# Patient Record
Sex: FEMALE | Race: WHITE | NOT HISPANIC OR LATINO | URBAN - METROPOLITAN AREA
[De-identification: names, ages, dates, MRNs, and addresses within clinical notes are randomized per-mention and may not be internally consistent; named-entity substitution may affect disease eponyms.]

---

## 2022-05-23 NOTE — H&P PST ADULT - HISTORY OF PRESENT ILLNESS
Department of Cardiology                                                                  [facility]/[place] [address] [address]                                                            Telephone: [phone]. Fax:[phone]                                                                             Pre- Procedure H + P/Progress Note      HPI:  57 yo female with h/o being mental challenged, Kavon syndrome s/p supravalvular AS, TR, MR, GERD, IBS, CAD s/p stent, HLD who had a CT scan of the chest which revealed coronary calcification. She also had leonidas stress test but results unavailable.      Symptoms:        Angina (Class):        Ischemic Symptoms:     Heart Failure:        Systolic/Diastolic/Combined:        NYHA Class (within 2 weeks):     Assessment of LVEF:       EF:        Assessed by:        Date:     Prior Cardiac Interventions:         Noninvasive Testing:   Stress Test: Date:        Protocol:        Duration of Exercise:        Symptoms:        EKG Changes:        DTS:        Myocardial Imaging:        Risk Assessment (Low, Medium, High):     Echo:       Risk Assessments:  ASA:  Mallampati:  Bleeding Risk:  Creatinine:  GFR:    Associated Risk Factors:        Cerebrovascular Disease: N/A       Chronic Lung Disease: N/A       Peripheral Arterial Disease: N/A       Chronic Kidney Disease (if yes, what is GFR): N/A       Uncontrolled Diabetes (if yes, what is HgbA1C or FBS): N/A       Poorly Controlled Hypertension (if yes, what is SBP): N/A       Morbid Obesity (if yes, what is BMI): N/A       History of Recent Ventricular Arrhythmia: N/A       Inability to Ambulate Safely: N/A       Need for Therapeutic Anticoagulation: N/A       Antiplatelet or Contrast Allergy: N/A       Fraility: Mild/Moderate/Severe    Antianginal Therapies:        Beta Blockers:         Calcium Channel Blockers:        Long Acting Nitrates:        Ranexa:     	  MEDICATIONS:                      T(C): --  HR: --  BP: --  RR: --  SpO2: --  Wt(kg): --      I&O's Summary      Daily     Daily     TELEMETRY: 	      ECG:  	    LABS:	 	                        Tnl:    Lipid Profile:   TC  TG  LDL  HDL    HgA1c:     proBNP:     TSH:                                                                         Pre- Procedure H + P/Progress Note      HPI:  57 yo female with h/o being mental challenged, Kavon syndrome s/p supravalvular AS, TR, MR, GERD, IBS, CAD s/p stent, HLD who had a CT scan of the chest which revealed coronary calcification. She also had leonidas stress test but results unavailable.      Symptoms:        Angina (Class):        Ischemic Symptoms:     Heart Failure:        Systolic/Diastolic/Combined:        NYHA Class (within 2 weeks):     Assessment of LVEF:       EF:         Assessed by:        Date:     Prior Cardiac Interventions:         Noninvasive Testing:   Stress Test: Date:        Protocol:        Duration of Exercise:        Symptoms:        EKG Changes:        DTS:        Myocardial Imaging:        Risk Assessment (Low, Medium, High):     Echo:       Risk Assessments:  ASA:  Mallampati:  Bleeding Risk:  Creatinine:  GFR:    Associated Risk Factors:        Cerebrovascular Disease: N/A       Chronic Lung Disease: N/A       Peripheral Arterial Disease: N/A       Chronic Kidney Disease (if yes, what is GFR): N/A       Uncontrolled Diabetes (if yes, what is HgbA1C or FBS): N/A       Poorly Controlled Hypertension (if yes, what is SBP): N/A       Morbid Obesity (if yes, what is BMI): N/A       History of Recent Ventricular Arrhythmia: N/A       Inability to Ambulate Safely: N/A       Need for Therapeutic Anticoagulation: N/A       Antiplatelet or Contrast Allergy: N/A       Fraility: Mild/Moderate/Severe    Antianginal Therapies:        Beta Blockers:         Calcium Channel Blockers:        Long Acting Nitrates:        Ranexa:     	  MEDICATIONS:                      T(C): --  HR: --  BP: --  RR: --  SpO2: --  Wt(kg): --      I&O's Summary      Daily     Daily     TELEMETRY: 	      ECG:  	    LABS:	 	                        Tnl:    Lipid Profile:   TC  TG  LDL  HDL    HgA1c:     proBNP:     TSH:                                                                         Pre- Procedure H + P/Progress Note      HPI:  57 yo female with h/o being mental challenged, Kavon syndrome s/p supravalvular AS, TR, MR, GERD, IBS, CAD s/p stent, HLD who had a CT scan of the chest which revealed coronary calcification. She also had leonidas stress test but results unavailable.      Symptoms:        Angina (Class):   II        Ischemic Symptoms:     Heart Failure:        Systolic/Diastolic/Combined:   No        NYHA Class (within 2 weeks):   No     Assessment of LVEF:  unavailable        EF:           Assessed by:        Date:     Prior Cardiac Interventions:   None          Noninvasive Testing:     Stress Test: Date:        Protocol:        Duration of Exercise:        Symptoms:        EKG Changes:        DTS:        Myocardial Imaging: abnormal stress        Risk Assessment (Low, Medium, High):     Echo:       Risk Assessments:  ASA:    2  Mallampati:    2  Bleeding Risk:  Creatinine:  GFR:    Associated Risk Factors:        Cerebrovascular Disease: N/A       Chronic Lung Disease: N/A       Peripheral Arterial Disease: N/A       Chronic Kidney Disease (if yes, what is GFR): N/A       Uncontrolled Diabetes (if yes, what is HgbA1C or FBS): N/A       Poorly Controlled Hypertension (if yes, what is SBP): N/A       Morbid Obesity (if yes, what is BMI): N/A       History of Recent Ventricular Arrhythmia: N/A       Inability to Ambulate Safely: N/A       Need for Therapeutic Anticoagulation: N/A       Antiplatelet or Contrast Allergy: N/A       Fraility: Mild/Moderate/Severe    Antianginal Therapies:        Beta Blockers:    Metoprolol        Calcium Channel Blockers:        Long Acting Nitrates:        Ranexa:     	  MEDICATIONS:    T(C): --  HR: --  BP: --  RR: --  SpO2: --  Wt(kg): --      I&O's Summary    Daily     TELEMETRY:   NSR 	      ECG:  	      LABS:	 	    Tnl:    Lipid Profile:   TC  TG  LDL  HDL    HgA1c:     proBNP:     TSH:                                                                         Pre- Procedure H + P/Progress Note      HPI:  57 yo female with h/o being mental challenged, Kavon syndrome s/p supravalvular AS, TR, MR, GERD, IBS, CAD s/p stent, HLD who had a CT scan of the chest which revealed coronary calcification. She also had leondias stress test but results unavailable.      Symptoms:        Angina (Class):   II        Ischemic Symptoms:  intermittent chest pain     Heart Failure:        Systolic/Diastolic/Combined:   No        NYHA Class (within 2 weeks):   No     Assessment of LVEF:  unavailable        EF:           Assessed by:        Date:     Prior Cardiac Interventions:   None          Noninvasive Testing:     Stress Test: Date:        Protocol:        Duration of Exercise:        Symptoms:        EKG Changes:        DTS:        Myocardial Imaging: abnormal stress        Risk Assessment (Low, Medium, High):     Echo:       Risk Assessments:  ASA:    2  Mallampati:    2  Bleeding Risk:  1.0%  Creatinine:   0.69  GFR:  101    Associated Risk Factors:        Cerebrovascular Disease: N/A       Chronic Lung Disease: N/A       Peripheral Arterial Disease: N/A       Chronic Kidney Disease (if yes, what is GFR): N/A       Uncontrolled Diabetes (if yes, what is HgbA1C or FBS): N/A       Poorly Controlled Hypertension (if yes, what is SBP): N/A       Morbid Obesity (if yes, what is BMI): N/A       History of Recent Ventricular Arrhythmia: N/A       Inability to Ambulate Safely: N/A       Need for Therapeutic Anticoagulation: N/A       Antiplatelet or Contrast Allergy: N/A       Fraility: Mild/Moderate/Severe    Antianginal Therapies:        Beta Blockers:    Metoprolol        Calcium Channel Blockers:        Long Acting Nitrates:        Ranexa:     	  MEDICATIONS:    T(C): --   98.4  HR: --    77   BP: --   143/73  RR: --   18  SpO2: --    98  Wt(kg): --      I&O's Summary    Daily     TELEMETRY:   NSR 	      ECG:  	NSR       LABS:	 	    Tnl:    Lipid Profile:   TC    169  TG    68  LDL   90  HDL   65    HgA1c:        proBNP:     TSH:                                                                         Pre- Procedure H + P/Progress Note      HPI:  57 yo female with h/o being mental challenged, Kavon syndrome s/p supravalvular AS, TR, MR, GERD, IBS, CAD s/p stent, HLD who had a CT scan of the chest which revealed coronary calcification. She also had leonidas stress test which was reported abnormal. Given patient's abnormal findings, she is now scheduled for left heart catheterization to evaluate progression of CAD.       Symptoms:        Angina (Class):   II        Ischemic Symptoms:  intermittent chest pain     Heart Failure:        Systolic/Diastolic/Combined:   No        NYHA Class (within 2 weeks):   No     Assessment of LVEF:  unavailable        EF:           Assessed by:        Date:     Prior Cardiac Interventions:   None          Noninvasive Testing:     Stress Test: Date:        Protocol:        Duration of Exercise:        Symptoms:        EKG Changes:        DTS:        Myocardial Imaging: abnormal stress        Risk Assessment (Low, Medium, High):     Echo:       Risk Assessments:  ASA:    2  Mallampati:    2  Bleeding Risk:  1.0%  Creatinine:   0.69  GFR:  101    Associated Risk Factors:        Cerebrovascular Disease: N/A       Chronic Lung Disease: N/A       Peripheral Arterial Disease: N/A       Chronic Kidney Disease (if yes, what is GFR): N/A       Uncontrolled Diabetes (if yes, what is HgbA1C or FBS): N/A       Poorly Controlled Hypertension (if yes, what is SBP): N/A       Morbid Obesity (if yes, what is BMI): N/A       History of Recent Ventricular Arrhythmia: N/A       Inability to Ambulate Safely: N/A       Need for Therapeutic Anticoagulation: N/A       Antiplatelet or Contrast Allergy: N/A       Fraility: Mild/Moderate/Severe    Antianginal Therapies:        Beta Blockers:    Metoprolol        Calcium Channel Blockers:        Long Acting Nitrates:        Ranexa:     	  MEDICATIONS:    T(C): --   98.4  HR: --    77   BP: --   143/73  RR: --   18  SpO2: --    98  Wt(kg): --      I&O's Summary    Daily     TELEMETRY:   NSR 	      ECG:  	NSR       LABS:	 	    Tnl:    Lipid Profile:   TC    169  TG    68  LDL   90  HDL   65    HgA1c:        proBNP:     TSH:

## 2022-05-23 NOTE — H&P PST ADULT - ASSESSMENT
Impression:    55 yo female with calcification on CT and abnormal stress test who presents for LHC.    Plan:  -plan for LHC via RA vs FA  -patient seen and examined  -confirmed appropriate NPO duration  -ECG and Labs reviewed  -Aspirin 81mg po pre-cath  -procedure discussed with patient; risks and benefits explained, questions answered  -consent obtained by attending IC       Impression:    57 yo female with calcification on CT and stress test with full report missing who presents for LHC.    Plan:  -plan for LHC via RA vs FA  -patient seen and examined  -confirmed appropriate NPO duration  -ECG and Labs reviewed  -Aspirin 81mg po pre-cath  -procedure discussed with patient; risks and benefits explained, questions answered  -consent obtained by attending IC       Impression:    55 yo female with calcification on CT and stress test with full report missing who presents for LHC.    Plan:  -plan for LHC via RA vs FA  -patient seen and examined  -confirmed appropriate NPO duration  -ECG and Labs reviewed  -Aspirin 81mg po pre-cath  -IVH  ml x1 bolus pre and post catheterization   -procedure discussed with patient; risks and benefits explained, questions answered  -consent obtained by attending IC       Impression:    57 yo female with calcification on CT and stress which were reported abnormal.     Plan:  -plan for LHC via RA vs FA  -patient seen and examined  -confirmed appropriate NPO duration  -ECG and Labs reviewed  -no IVH 2/2 pt with reported history of AS/MR, unknown severity ( no echo available)   -Aspirin 81mg po pre-cath  -procedure discussed with patient; risks and benefits explained, questions answered  -consent obtained by attending IC

## 2022-05-24 ENCOUNTER — OUTPATIENT (OUTPATIENT)
Dept: OUTPATIENT SERVICES | Facility: HOSPITAL | Age: 57
LOS: 1 days | Discharge: HOME | End: 2022-05-24
Payer: MEDICARE

## 2022-05-24 VITALS
HEART RATE: 77 BPM | TEMPERATURE: 98 F | OXYGEN SATURATION: 98 % | HEIGHT: 60 IN | RESPIRATION RATE: 18 BRPM | WEIGHT: 138.89 LBS | SYSTOLIC BLOOD PRESSURE: 143 MMHG | DIASTOLIC BLOOD PRESSURE: 75 MMHG

## 2022-05-24 VITALS
RESPIRATION RATE: 18 BRPM | DIASTOLIC BLOOD PRESSURE: 57 MMHG | OXYGEN SATURATION: 98 % | HEART RATE: 62 BPM | SYSTOLIC BLOOD PRESSURE: 110 MMHG

## 2022-05-24 DIAGNOSIS — R07.9 CHEST PAIN, UNSPECIFIED: ICD-10-CM

## 2022-05-24 LAB
ALBUMIN SERPL ELPH-MCNC: 4.1 G/DL — SIGNIFICANT CHANGE UP (ref 3.3–5.2)
ALP SERPL-CCNC: 116 U/L — SIGNIFICANT CHANGE UP (ref 40–120)
ALT FLD-CCNC: 12 U/L — SIGNIFICANT CHANGE UP
ANION GAP SERPL CALC-SCNC: 13 MMOL/L — SIGNIFICANT CHANGE UP (ref 5–17)
AST SERPL-CCNC: 22 U/L — SIGNIFICANT CHANGE UP
BASOPHILS # BLD AUTO: 0.07 K/UL — SIGNIFICANT CHANGE UP (ref 0–0.2)
BASOPHILS NFR BLD AUTO: 1.1 % — SIGNIFICANT CHANGE UP (ref 0–2)
BILIRUB SERPL-MCNC: 0.4 MG/DL — SIGNIFICANT CHANGE UP (ref 0.4–2)
BUN SERPL-MCNC: 14.8 MG/DL — SIGNIFICANT CHANGE UP (ref 8–20)
CALCIUM SERPL-MCNC: 9.5 MG/DL — SIGNIFICANT CHANGE UP (ref 8.6–10.2)
CHLORIDE SERPL-SCNC: 103 MMOL/L — SIGNIFICANT CHANGE UP (ref 98–107)
CHOLEST SERPL-MCNC: 169 MG/DL — SIGNIFICANT CHANGE UP
CO2 SERPL-SCNC: 24 MMOL/L — SIGNIFICANT CHANGE UP (ref 22–29)
CREAT SERPL-MCNC: 0.69 MG/DL — SIGNIFICANT CHANGE UP (ref 0.5–1.3)
EGFR: 101 ML/MIN/1.73M2 — SIGNIFICANT CHANGE UP
EOSINOPHIL # BLD AUTO: 0.05 K/UL — SIGNIFICANT CHANGE UP (ref 0–0.5)
EOSINOPHIL NFR BLD AUTO: 0.8 % — SIGNIFICANT CHANGE UP (ref 0–6)
GLUCOSE SERPL-MCNC: 105 MG/DL — HIGH (ref 70–99)
HCT VFR BLD CALC: 39.4 % — SIGNIFICANT CHANGE UP (ref 34.5–45)
HDLC SERPL-MCNC: 65 MG/DL — SIGNIFICANT CHANGE UP
HGB BLD-MCNC: 13.2 G/DL — SIGNIFICANT CHANGE UP (ref 11.5–15.5)
IMM GRANULOCYTES NFR BLD AUTO: 0.5 % — SIGNIFICANT CHANGE UP (ref 0–1.5)
LIPID PNL WITH DIRECT LDL SERPL: 90 MG/DL — SIGNIFICANT CHANGE UP
LYMPHOCYTES # BLD AUTO: 2.04 K/UL — SIGNIFICANT CHANGE UP (ref 1–3.3)
LYMPHOCYTES # BLD AUTO: 32.4 % — SIGNIFICANT CHANGE UP (ref 13–44)
MAGNESIUM SERPL-MCNC: 1.8 MG/DL — SIGNIFICANT CHANGE UP (ref 1.6–2.6)
MCHC RBC-ENTMCNC: 33.5 GM/DL — SIGNIFICANT CHANGE UP (ref 32–36)
MCHC RBC-ENTMCNC: 34.3 PG — HIGH (ref 27–34)
MCV RBC AUTO: 102.3 FL — HIGH (ref 80–100)
MONOCYTES # BLD AUTO: 0.76 K/UL — SIGNIFICANT CHANGE UP (ref 0–0.9)
MONOCYTES NFR BLD AUTO: 12.1 % — SIGNIFICANT CHANGE UP (ref 2–14)
NEUTROPHILS # BLD AUTO: 3.35 K/UL — SIGNIFICANT CHANGE UP (ref 1.8–7.4)
NEUTROPHILS NFR BLD AUTO: 53.1 % — SIGNIFICANT CHANGE UP (ref 43–77)
NON HDL CHOLESTEROL: 104 MG/DL — SIGNIFICANT CHANGE UP
PLATELET # BLD AUTO: 200 K/UL — SIGNIFICANT CHANGE UP (ref 150–400)
POTASSIUM SERPL-MCNC: 4.4 MMOL/L — SIGNIFICANT CHANGE UP (ref 3.5–5.3)
POTASSIUM SERPL-SCNC: 4.4 MMOL/L — SIGNIFICANT CHANGE UP (ref 3.5–5.3)
PROT SERPL-MCNC: 7 G/DL — SIGNIFICANT CHANGE UP (ref 6.6–8.7)
RBC # BLD: 3.85 M/UL — SIGNIFICANT CHANGE UP (ref 3.8–5.2)
RBC # FLD: 12.3 % — SIGNIFICANT CHANGE UP (ref 10.3–14.5)
SODIUM SERPL-SCNC: 140 MMOL/L — SIGNIFICANT CHANGE UP (ref 135–145)
TRIGL SERPL-MCNC: 68 MG/DL — SIGNIFICANT CHANGE UP
WBC # BLD: 6.3 K/UL — SIGNIFICANT CHANGE UP (ref 3.8–10.5)
WBC # FLD AUTO: 6.3 K/UL — SIGNIFICANT CHANGE UP (ref 3.8–10.5)

## 2022-05-24 PROCEDURE — 93458 L HRT ARTERY/VENTRICLE ANGIO: CPT

## 2022-05-24 PROCEDURE — 93010 ELECTROCARDIOGRAM REPORT: CPT

## 2022-05-24 PROCEDURE — 93005 ELECTROCARDIOGRAM TRACING: CPT

## 2022-05-24 PROCEDURE — 85025 COMPLETE CBC W/AUTO DIFF WBC: CPT

## 2022-05-24 PROCEDURE — 99152 MOD SED SAME PHYS/QHP 5/>YRS: CPT

## 2022-05-24 PROCEDURE — 80053 COMPREHEN METABOLIC PANEL: CPT

## 2022-05-24 PROCEDURE — 36415 COLL VENOUS BLD VENIPUNCTURE: CPT

## 2022-05-24 PROCEDURE — C1760: CPT

## 2022-05-24 PROCEDURE — C1894: CPT

## 2022-05-24 PROCEDURE — 80061 LIPID PANEL: CPT

## 2022-05-24 PROCEDURE — C1887: CPT

## 2022-05-24 PROCEDURE — 83735 ASSAY OF MAGNESIUM: CPT

## 2022-05-24 RX ORDER — SODIUM CHLORIDE 9 MG/ML
250 INJECTION INTRAMUSCULAR; INTRAVENOUS; SUBCUTANEOUS ONCE
Refills: 0 | Status: COMPLETED | OUTPATIENT
Start: 2022-05-24 | End: 2022-05-24

## 2022-05-24 RX ORDER — QUETIAPINE FUMARATE 200 MG/1
0.5 TABLET, FILM COATED ORAL
Qty: 0 | Refills: 0 | DISCHARGE

## 2022-05-24 RX ORDER — FLUTICASONE PROPIONATE 220 MCG
2 AEROSOL WITH ADAPTER (GRAM) INHALATION
Qty: 0 | Refills: 0 | DISCHARGE

## 2022-05-24 RX ORDER — POTASSIUM CHLORIDE 20 MEQ
1 PACKET (EA) ORAL
Qty: 0 | Refills: 0 | DISCHARGE

## 2022-05-24 RX ORDER — METOPROLOL TARTRATE 50 MG
1 TABLET ORAL
Qty: 0 | Refills: 0 | DISCHARGE

## 2022-05-24 RX ORDER — LEVOCETIRIZINE DIHYDROCHLORIDE 0.5 MG/ML
1 SOLUTION ORAL
Qty: 0 | Refills: 0 | DISCHARGE

## 2022-05-24 RX ORDER — TIMOLOL 0.5 %
1 DROPS OPHTHALMIC (EYE)
Qty: 0 | Refills: 0 | DISCHARGE

## 2022-05-24 RX ORDER — ACETAMINOPHEN 500 MG
1000 TABLET ORAL ONCE
Refills: 0 | Status: COMPLETED | OUTPATIENT
Start: 2022-05-24 | End: 2022-05-24

## 2022-05-24 RX ORDER — LATANOPROST 0.05 MG/ML
1 SOLUTION/ DROPS OPHTHALMIC; TOPICAL
Qty: 0 | Refills: 0 | DISCHARGE

## 2022-05-24 RX ORDER — QUETIAPINE FUMARATE 200 MG/1
1 TABLET, FILM COATED ORAL
Qty: 0 | Refills: 0 | DISCHARGE

## 2022-05-24 RX ORDER — ESOMEPRAZOLE MAGNESIUM 40 MG/1
1 CAPSULE, DELAYED RELEASE ORAL
Qty: 0 | Refills: 0 | DISCHARGE

## 2022-05-24 RX ORDER — POLYETHYLENE GLYCOL 3350 17 G/17G
1 POWDER, FOR SOLUTION ORAL
Qty: 0 | Refills: 0 | DISCHARGE

## 2022-05-24 RX ORDER — DOCUSATE SODIUM 100 MG
1 CAPSULE ORAL
Qty: 0 | Refills: 0 | DISCHARGE

## 2022-05-24 RX ORDER — ASPIRIN/CALCIUM CARB/MAGNESIUM 324 MG
1 TABLET ORAL
Qty: 0 | Refills: 0 | DISCHARGE

## 2022-05-24 RX ORDER — ATORVASTATIN CALCIUM 80 MG/1
1 TABLET, FILM COATED ORAL
Qty: 0 | Refills: 0 | DISCHARGE

## 2022-05-24 RX ORDER — BRIMONIDINE TARTRATE 2 MG/MG
1 SOLUTION/ DROPS OPHTHALMIC
Qty: 0 | Refills: 0 | DISCHARGE

## 2022-05-24 RX ADMIN — Medication 400 MILLIGRAM(S): at 14:45

## 2022-05-24 RX ADMIN — Medication 1000 MILLIGRAM(S): at 15:14

## 2022-05-24 NOTE — DISCHARGE NOTE PROVIDER - HOSPITAL COURSE
55 yo female with h/o being mental challenged, Kavon syndrome s/p supravalvular AS, TR, MR, GERD, IBS, CAD s/p stent, HLD who had a CT scan of the chest which revealed coronary calcification. She also had leonidas stress test but results unavailable.     55 yo female with h/o being mental challenged, Kavon syndrome s/p supravalvular AS, TR, MR, GERD, IBS, CAD s/p stent, HLD who had a CT scan of the chest which revealed coronary calcification. She also had leonidas stress test which was reported abnormal. Given patient's abnormal findings, she is now scheduled for left heart catheterization to evaluate progression of CAD.  Patient now status post left heart catheterization via right femoral aretry which showed clean coronaries. Hemostasis achieved with 6 [place] Angioseal. Continue current medical management. Follow with Dr. Almanza on discharge.

## 2022-05-24 NOTE — DISCHARGE NOTE PROVIDER - NSDCMRMEDTOKEN_GEN_ALL_CORE_FT
Alphagan P 0.1% ophthalmic solution: 1 dose(s) to each affected eye 2 times a day  aspirin 81 mg oral tablet, chewable: 1 tab(s) orally once a day  Ativan 0.5 mg oral tablet:   atorvastatin 10 mg oral tablet: 1 tab(s) orally once a day  Colace 50 mg oral capsule: 1 cap(s) orally 2 times a day  fluticasone propionate 55 mcg/inh inhalation powder: 2 inhaler(s) inhaled once a day  Klor-Con 20 mEq oral powder for reconstitution: 1 each orally once a day  latanoprost 0.005% ophthalmic solution: 1 drop(s) to each affected eye once a day (in the evening)  NexIUM 20 mg oral delayed release capsule: 1 cap(s) orally once a day  polyethylene glycol 3350 oral powder for reconstitution: 1 dose(s) orally once a day  SEROquel 50 mg oral tablet: 1 tab(s) orally once a day  SEROquel 50 mg oral tablet: 0.5 tab(s) orally once a day (at bedtime)  Timolol Maleate, Ophthalmic 0.5% preservative-free ophthalmic solution: 1 drop(s) to each affected eye 2 times a day  Toprol-XL 50 mg oral tablet, extended release: 1 tab(s) orally once a day  Xyzal 5 mg oral tablet: 1 tab(s) orally once a day (in the evening)

## 2022-05-24 NOTE — DISCHARGE NOTE NURSING/CASE MANAGEMENT/SOCIAL WORK - NSDCPEFALRISK_GEN_ALL_CORE
For information on Fall & Injury Prevention, visit: [email or web address] OR  [email or web address] OR  https://www.cdc.gov/steadi/patient.html

## 2022-05-24 NOTE — DISCHARGE NOTE PROVIDER - NSDCCPCAREPLAN_GEN_ALL_CORE_FT
PRINCIPAL DISCHARGE DIAGNOSIS  Diagnosis: CAD (coronary artery disease)  Assessment and Plan of Treatment: 55 yo female with h/o being mental challenged, Kavon syndrome s/p supravalvular AS, TR, MR, GERD, IBS, CAD s/p stent, HLD who had a CT scan of the chest which revealed coronary calcification. She also had leonidas stress test which was resulted abnormal. Patient to follow with Dr. Almanza on discharge.

## 2022-05-24 NOTE — CHART NOTE - NSCHARTNOTEFT_GEN_A_CORE
57 yo female with h/o being mental challenged, Kavon syndrome s/p supravalvular AS, TR, MR, GERD, IBS, CAD s/p stent, HLD who had a CT scan of the chest which revealed coronary calcification. She also had leonidas stress test which was reported abnormal. Given patient's abnormal findings, she is now scheduled for left heart catheterization to evaluate progression of CAD. Patient now status post left heart catheterization which showed clean coronaries. Continue current medical management. Follow with Dr. Almanza on discharge.       Plan:  -Formal cath report pending  -Post procedure management/monitoring per protocol  -Access site precautions   -R groin site  dressing CDI no bleeding no hematoma noted, site soft non tender, positive pedal pulses bilat  -found with clean coronaries   -Continue current medical therapy; Statin   -Educated regarding post procedure management and care  -Discussed the importance of risk factors modification  -patient not a candidate for cardiac rehab secondary to:  patient found with clean coronaries, no PCI with [facility] 57 yo female with h/o being mental challenged, Kavon syndrome s/p supravalvular AS, TR, MR, GERD, IBS, CAD s/p stent, HLD who had a CT scan of the chest which revealed coronary calcification. She also had leonidas stress test which was reported abnormal. Given patient's abnormal findings, she is now scheduled for left heart catheterization to evaluate progression of CAD. Patient now status post left heart catheterization which showed clean coronaries. Continue current medical management. Follow with Dr. Almanza on discharge.       Plan:  -Formal cath report pending  -Post procedure management/monitoring per protocol  -Access site precautions   -R groin site  dressing CDI no bleeding no hematoma noted, site soft non tender, positive pedal pulses bilat  -right femoral artery with Angioseal closure   -found with clean coronaries   -Continue current medical therapy; Statin   -Educated regarding post procedure management and care  -Discussed the importance of risk factors modification  -patient not a candidate for cardiac rehab secondary to:  patient found with clean coronaries, no PCI with [facility]

## 2022-05-24 NOTE — ASU DISCHARGE PLAN (ADULT/PEDIATRIC) - NS MD DC FALL RISK RISK
For information on Fall & Injury Prevention, visit: [email or web address] OR  [email or web address] OR  https://www.cdc.gov/steadi/patient.html

## 2022-05-24 NOTE — CONSULT NOTE ADULT - SUBJECTIVE AND OBJECTIVE BOX
Patient is a 56y old  Female who presents with a chief complaint of abnormal nuclear stress test      HPI:  57 yo female with h/o being mental challenged, Kavon syndrome s/p supravalvular AS, TR, MR, GERD, IBS, CAD s/p stent, HLD who had a CT scan of the chest which revealed coronary calcification. She also had leonidas stress test which was reported abnormal.       PAST MEDICAL & SURGICAL HISTORY:  HTN (hypertension)      HLD (hyperlipidemia)      CAD (coronary artery disease)      Allergies    lactose (Unknown)  sulfa drugs (Rash)    Intolerances        MEDICATIONS  (STANDING):  ASA 81 daily  Lipitor 10  Nexium  Flonase  Toprol XL 50 daily  Klor 20  Seroquel    FAMILY HISTORY:  Mother with CVA      SOCIAL HISTORY:    CIGARETTES: Never    ALCOHOL:  None    REVIEW OF SYSTEMS:  CONSTITUTIONAL: No fever, weight loss, or fatigue  EYES: No eye pain, visual disturbances, or discharge  ENMT:  No difficulty hearing, tinnitus, vertigo; No sinus or throat pain  NECK: No pain or stiffness  RESPIRATORY: No cough, wheezing, chills or hemoptysis; No Shortness of Breath  CARDIOVASCULAR: No chest pain, palpitations, passing out, dizziness, or leg swelling  GASTROINTESTINAL: No abdominal or epigastric pain. No nausea, vomiting, or hematemesis; No diarrhea or constipation. No melena or hematochezia.  GENITOURINARY: No dysuria, frequency, hematuria, or incontinence  NEUROLOGICAL: No headaches, memory loss, loss of strength, numbness, or tremors  SKIN: No itching, burning, rashes, or lesions   LYMPH Nodes: No enlarged glands  ENDOCRINE: No heat or cold intolerance; No hair loss  MUSCULOSKELETAL: No joint pain or swelling; No muscle, back, or extremity pain  PSYCHIATRIC: No depression, anxiety, mood swings, or difficulty sleeping  HEME/LYMPH: No easy bruising, or bleeding gums  ALLERY AND IMMUNOLOGIC: No hives or eczema	    Vital Signs Last 24 Hrs  T(C): 36.9 (24 May 2022 11:35), Max: 36.9 (24 May 2022 11:35)  T(F): 98.4 (24 May 2022 11:35), Max: 98.4 (24 May 2022 11:35)  HR: 62 (24 May 2022 17:05) (60 - 77)  BP: 110/57 (24 May 2022 17:05) (105/52 - 152/83)  BP(mean): --  RR: 18 (24 May 2022 17:05) (18 - 18)  SpO2: 98% (24 May 2022 17:05) (96% - 98%)    Daily Height in cm: 152.4 (24 May 2022 11:35)    Daily     I&O's Detail    24 May 2022 07:01  -  24 May 2022 22:40  --------------------------------------------------------  IN:    IV PiggyBack: 100 mL  Total IN: 100 mL    OUT:    Voided (mL): 200 mL  Total OUT: 200 mL    Total NET: -100 mL          PHYSICAL EXAM:  Appearance: Normal, well nourished	  HEENT:   Normal oral mucosa, PERRL, EOMI, sclera non-icteric	  Cardiovascular: Normal S1 S2, No JVD, 1-2/6 systolic cardiac murmurs, No carotid bruits, No peripheral edema  Respiratory: Lungs clear to auscultation	  Psychiatry: A & O x 3, Mood & affect appropriate  Gastrointestinal:  Soft, Non-tender, + BS, no bruits	  Skin: No rashes, No ecchymoses, No cyanosis  Neurologic: Grossly non-focal with full strength in all four extremities  Extremities: Normal range of motion, No clubbing, cyanosis or edema  Vascular: Peripheral pulses palpable 2+ bilaterally    LABS:                        13.2   6.30  )-----------( 200      ( 24 May 2022 11:56 )             39.4     05-24    140  |  103  |  14.8  ----------------------------<  105<H>  4.4   |  24.0  |  0.69    Ca    9.5      24 May 2022 11:56  Mg     1.8     05-24    TPro  7.0  /  Alb  4.1  /  TBili  0.4  /  DBili  x   /  AST  22  /  ALT  12  /  AlkPhos  116  05-24            I&O's Summary    24 May 2022 07:01  -  24 May 2022 22:40  --------------------------------------------------------  IN: 100 mL / OUT: 200 mL / NET: -100 mL      BNP  RADIOLOGY & ADDITIONAL STUDIES:    Assessment:  57 yo female with h/o being mental challenged, Kavon syndrome s/p supravalvular AS, TR, MR, GERD, IBS, CAD s/p stent, HLD who had a CT scan of the chest which revealed coronary calcification. She also had leonidas stress test which was reported abnormal.     Plan:  Cardiac catheterization and possible percutaneous intervention recommended.  Risks, benefits, and alternatives reviewed.  Risks including but not limited to MI, death, stroke, bleeding, infection, vessel injury, hematoma, renal failure, allergic reaction, urgent open heart surgery, restenosis and stent thrombosis were reviewed.  All questions answered.  Patient is agreeable to proceed.

## 2022-05-24 NOTE — ASU PATIENT PROFILE, ADULT - FALL HARM RISK - UNIVERSAL INTERVENTIONS
Bed in lowest position, wheels locked, appropriate side rails in place/Call bell, personal items and telephone in reach/Instruct patient to call for assistance before getting out of bed or chair/Non-slip footwear when patient is out of bed/[place] to call system/Physically safe environment - no spills, clutter or unnecessary equipment/Purposeful Proactive Rounding/Room/bathroom lighting operational, light cord in reach

## 2022-05-24 NOTE — DISCHARGE NOTE PROVIDER - CARE PROVIDER_API CALL
Yuri Almanza (MD)  Cardiovascular Disease; Internal Medicine; Nuclear Cardiology  [address] [place] [address]  Phone: [phone]  Fax: [phone]  Follow Up Time:

## 2022-05-24 NOTE — DISCHARGE NOTE NURSING/CASE MANAGEMENT/SOCIAL WORK - PATIENT PORTAL LINK FT
You can access the FollowMyHealth Patient Portal offered by [facility] by registering at the following website: [email or web address]. By joining [employer]’s FollowMyHealth portal, you will also be able to view your health information using other applications (apps) compatible with our system.

## 2022-05-24 NOTE — DISCHARGE NOTE PROVIDER - NSDCCPTREATMENT_GEN_ALL_CORE_FT
PRINCIPAL PROCEDURE  Procedure: Left heart catheterization  Findings and Treatment:        PRINCIPAL PROCEDURE  Procedure: Left heart catheterization  Findings and Treatment: Patient found with clear cornoaries. Continue current medical management. Follow with Dr Almanza on discharge.

## 2022-05-27 DIAGNOSIS — I25.10 ATHEROSCLEROTIC HEART DISEASE OF NATIVE CORONARY ARTERY WITHOUT ANGINA PECTORIS: ICD-10-CM

## 2023-01-26 ENCOUNTER — OFFICE (OUTPATIENT)
Dept: URBAN - METROPOLITAN AREA CLINIC 100 | Facility: CLINIC | Age: 58
Setting detail: OPHTHALMOLOGY
End: 2023-01-26
Payer: MEDICARE

## 2023-01-26 DIAGNOSIS — H40.1133: ICD-10-CM

## 2023-01-26 DIAGNOSIS — H40.033: ICD-10-CM

## 2023-01-26 DIAGNOSIS — H25.13: ICD-10-CM

## 2023-01-26 DIAGNOSIS — H55.01: ICD-10-CM

## 2023-01-26 PROCEDURE — 99214 OFFICE O/P EST MOD 30 MIN: CPT | Performed by: OPHTHALMOLOGY

## 2023-01-26 ASSESSMENT — SPHEQUIV_DERIVED
OS_SPHEQUIV: 2.125
OS_SPHEQUIV: 3.375

## 2023-01-26 ASSESSMENT — KERATOMETRY
OS_AXISANGLE_DEGREES: 079
OD_K2POWER_DIOPTERS: 45.75
METHOD_AUTO_MANUAL: AUTO
OS_K2POWER_DIOPTERS: 45.75
OD_AXISANGLE_DEGREES: 144
OD_K1POWER_DIOPTERS: 42.00
OS_K1POWER_DIOPTERS: 45.00

## 2023-01-26 ASSESSMENT — VISUAL ACUITY
OS_BCVA: NLP
OD_BCVA: 20/150

## 2023-01-26 ASSESSMENT — REFRACTION_MANIFEST
OS_CYLINDER: -0.75
OS_AXIS: 105
OS_VA1: 20/40
OS_SPHERE: +2.50
OS_ADD: +2.00

## 2023-01-26 ASSESSMENT — CONFRONTATIONAL VISUAL FIELD TEST (CVF)
OD_FINDINGS: FULL
OS_FINDINGS: FULL

## 2023-01-26 ASSESSMENT — TONOMETRY
OS_IOP_MMHG: 20
OD_IOP_MMHG: 19

## 2023-01-26 ASSESSMENT — REFRACTION_AUTOREFRACTION
OS_AXIS: 103
OS_CYLINDER: -1.75
OS_SPHERE: +4.25

## 2023-01-26 ASSESSMENT — AXIALLENGTH_DERIVED
OS_AL: 22.1669
OS_AL: 21.7451

## 2023-05-25 ENCOUNTER — OFFICE (OUTPATIENT)
Dept: URBAN - METROPOLITAN AREA CLINIC 100 | Facility: CLINIC | Age: 58
Setting detail: OPHTHALMOLOGY
End: 2023-05-25
Payer: MEDICARE

## 2023-05-25 DIAGNOSIS — H55.01: ICD-10-CM

## 2023-05-25 DIAGNOSIS — H25.13: ICD-10-CM

## 2023-05-25 DIAGNOSIS — H40.033: ICD-10-CM

## 2023-05-25 DIAGNOSIS — H40.1133: ICD-10-CM

## 2023-05-25 PROCEDURE — 92014 COMPRE OPH EXAM EST PT 1/>: CPT | Performed by: OPHTHALMOLOGY

## 2023-05-25 ASSESSMENT — REFRACTION_MANIFEST
OS_VA1: 20/40
OS_SPHERE: +2.50
OS_CYLINDER: -0.75
OS_ADD: +2.00
OS_AXIS: 105

## 2023-05-25 ASSESSMENT — REFRACTION_AUTOREFRACTION
OS_SPHERE: +4.25
OS_AXIS: 099
OS_CYLINDER: -2.25

## 2023-05-25 ASSESSMENT — CONFRONTATIONAL VISUAL FIELD TEST (CVF)
OD_FINDINGS: FULL
OS_FINDINGS: FULL

## 2023-05-25 ASSESSMENT — VISUAL ACUITY
OS_BCVA: NLP
OD_BCVA: 20/200

## 2023-05-25 ASSESSMENT — TONOMETRY
OD_IOP_MMHG: 16
OS_IOP_MMHG: 19

## 2023-05-25 ASSESSMENT — KERATOMETRY
OS_K1POWER_DIOPTERS: 45.00
METHOD_AUTO_MANUAL: AUTO
OS_K2POWER_DIOPTERS: 45.00
OS_AXISANGLE_DEGREES: 0

## 2023-05-25 ASSESSMENT — AXIALLENGTH_DERIVED
OS_AL: 22.2892
OS_AL: 21.9467

## 2023-05-25 ASSESSMENT — SPHEQUIV_DERIVED
OS_SPHEQUIV: 2.125
OS_SPHEQUIV: 3.125

## 2023-06-14 ENCOUNTER — OFFICE (OUTPATIENT)
Dept: URBAN - METROPOLITAN AREA CLINIC 100 | Facility: CLINIC | Age: 58
Setting detail: OPHTHALMOLOGY
End: 2023-06-14
Payer: MEDICARE

## 2023-06-14 DIAGNOSIS — H25.13: ICD-10-CM

## 2023-06-14 DIAGNOSIS — H40.1133: ICD-10-CM

## 2023-06-14 DIAGNOSIS — H55.01: ICD-10-CM

## 2023-06-14 DIAGNOSIS — H40.033: ICD-10-CM

## 2023-06-14 PROCEDURE — 92014 COMPRE OPH EXAM EST PT 1/>: CPT | Performed by: OPHTHALMOLOGY

## 2023-06-14 ASSESSMENT — SPHEQUIV_DERIVED
OS_SPHEQUIV: 2.125
OS_SPHEQUIV: 3.125

## 2023-06-14 ASSESSMENT — REFRACTION_MANIFEST
OS_CYLINDER: -0.75
OS_VA1: 20/40
OS_SPHERE: +2.50
OS_ADD: +2.00
OS_AXIS: 105

## 2023-06-14 ASSESSMENT — REFRACTION_AUTOREFRACTION
OS_CYLINDER: -2.25
OS_AXIS: 099
OS_SPHERE: +4.25

## 2023-06-14 ASSESSMENT — AXIALLENGTH_DERIVED
OS_AL: 22.2892
OS_AL: 21.9467

## 2023-06-14 ASSESSMENT — KERATOMETRY
OS_AXISANGLE_DEGREES: 0
OS_K2POWER_DIOPTERS: 45.00
METHOD_AUTO_MANUAL: AUTO
OS_K1POWER_DIOPTERS: 45.00

## 2023-06-14 ASSESSMENT — VISUAL ACUITY
OS_BCVA: NLP
OD_BCVA: 20/200

## 2023-06-14 ASSESSMENT — CONFRONTATIONAL VISUAL FIELD TEST (CVF)
OS_FINDINGS: FULL
OD_FINDINGS: FULL

## 2023-06-14 ASSESSMENT — TONOMETRY
OD_IOP_MMHG: 18
OS_IOP_MMHG: 20

## 2023-07-27 PROBLEM — D31.3 CHOROIDAL NEVUS: Status: ACTIVE | Noted: 2023-07-27

## 2024-09-10 PROBLEM — I25.10 ATHEROSCLEROTIC HEART DISEASE OF NATIVE CORONARY ARTERY WITHOUT ANGINA PECTORIS: Chronic | Status: ACTIVE | Noted: 2022-05-24

## 2024-09-10 PROBLEM — I10 ESSENTIAL (PRIMARY) HYPERTENSION: Chronic | Status: ACTIVE | Noted: 2022-05-24

## 2024-09-10 PROBLEM — E78.5 HYPERLIPIDEMIA, UNSPECIFIED: Chronic | Status: ACTIVE | Noted: 2022-05-24

## 2024-12-05 ENCOUNTER — OUTPATIENT (OUTPATIENT)
Dept: OUTPATIENT SERVICES | Facility: HOSPITAL | Age: 59
LOS: 1 days | Discharge: HOME | End: 2024-12-05
Payer: MEDICARE

## 2024-12-05 VITALS
SYSTOLIC BLOOD PRESSURE: 118 MMHG | TEMPERATURE: 98 F | OXYGEN SATURATION: 100 % | WEIGHT: 162.04 LBS | HEIGHT: 61 IN | DIASTOLIC BLOOD PRESSURE: 49 MMHG | RESPIRATION RATE: 18 BRPM | HEART RATE: 69 BPM

## 2024-12-05 DIAGNOSIS — K22.70 BARRETT'S ESOPHAGUS WITHOUT DYSPLASIA: ICD-10-CM

## 2024-12-05 DIAGNOSIS — Z98.890 OTHER SPECIFIED POSTPROCEDURAL STATES: Chronic | ICD-10-CM

## 2024-12-05 DIAGNOSIS — Z95.5 PRESENCE OF CORONARY ANGIOPLASTY IMPLANT AND GRAFT: Chronic | ICD-10-CM

## 2024-12-05 PROCEDURE — 88313 SPECIAL STAINS GROUP 2: CPT | Mod: 26

## 2024-12-05 PROCEDURE — 88305 TISSUE EXAM BY PATHOLOGIST: CPT | Mod: 26

## 2024-12-05 PROCEDURE — 88305 TISSUE EXAM BY PATHOLOGIST: CPT

## 2024-12-05 PROCEDURE — 88312 SPECIAL STAINS GROUP 1: CPT

## 2024-12-05 PROCEDURE — 88313 SPECIAL STAINS GROUP 2: CPT

## 2024-12-05 PROCEDURE — 88312 SPECIAL STAINS GROUP 1: CPT | Mod: 26

## 2024-12-05 RX ORDER — ESCITALOPRAM OXALATE 10 MG/1
0 TABLET, FILM COATED ORAL
Refills: 0 | DISCHARGE

## 2024-12-05 NOTE — ASU PATIENT PROFILE, ADULT - FALL HARM RISK - HARM RISK INTERVENTIONS

## 2024-12-05 NOTE — ASU PATIENT PROFILE, ADULT - NSICDXPASTMEDICALHX_GEN_ALL_CORE_FT
PAST MEDICAL HISTORY:  Arteriosclerosis     Blindness, legal     CAD (coronary artery disease)     Glaucoma     H/O allergic rhinitis     HLD (hyperlipidemia)     HTN (hypertension)     IBS (irritable bowel syndrome)     Lactose intolerance     Major depression     Mentally challenged     Stenosis of aortic valve     Traumatic brain injury with loss of consciousness     Undiagnosed cardiac murmurs

## 2024-12-05 NOTE — ASU PATIENT PROFILE, ADULT - NSICDXPASTSURGICALHX_GEN_ALL_CORE_FT
PAST SURGICAL HISTORY:  History of colon surgery     S/P faviola     S/P eye surgery     S/P primary angioplasty with coronary stent     Status post appendectomy

## 2024-12-09 LAB — SURGICAL PATHOLOGY STUDY: SIGNIFICANT CHANGE UP

## 2024-12-10 DIAGNOSIS — Z79.82 LONG TERM (CURRENT) USE OF ASPIRIN: ICD-10-CM

## 2024-12-10 DIAGNOSIS — K31.A0 GASTRIC INTESTINAL METAPLASIA, UNSPECIFIED: ICD-10-CM

## 2024-12-10 DIAGNOSIS — E78.5 HYPERLIPIDEMIA, UNSPECIFIED: ICD-10-CM

## 2024-12-10 DIAGNOSIS — F32.A DEPRESSION, UNSPECIFIED: ICD-10-CM

## 2024-12-10 DIAGNOSIS — K21.9 GASTRO-ESOPHAGEAL REFLUX DISEASE WITHOUT ESOPHAGITIS: ICD-10-CM

## 2024-12-10 DIAGNOSIS — K22.70 BARRETT'S ESOPHAGUS WITHOUT DYSPLASIA: ICD-10-CM

## 2024-12-10 DIAGNOSIS — K29.50 UNSPECIFIED CHRONIC GASTRITIS WITHOUT BLEEDING: ICD-10-CM

## 2024-12-10 DIAGNOSIS — I25.10 ATHEROSCLEROTIC HEART DISEASE OF NATIVE CORONARY ARTERY WITHOUT ANGINA PECTORIS: ICD-10-CM

## 2024-12-10 DIAGNOSIS — Z95.5 PRESENCE OF CORONARY ANGIOPLASTY IMPLANT AND GRAFT: ICD-10-CM

## 2024-12-10 DIAGNOSIS — K31.89 OTHER DISEASES OF STOMACH AND DUODENUM: ICD-10-CM

## 2024-12-10 DIAGNOSIS — Z88.2 ALLERGY STATUS TO SULFONAMIDES: ICD-10-CM
